# Patient Record
Sex: MALE
[De-identification: names, ages, dates, MRNs, and addresses within clinical notes are randomized per-mention and may not be internally consistent; named-entity substitution may affect disease eponyms.]

---

## 2017-06-21 PROBLEM — Z00.00 ENCOUNTER FOR PREVENTIVE HEALTH EXAMINATION: Status: ACTIVE | Noted: 2017-06-21

## 2017-07-18 ENCOUNTER — APPOINTMENT (OUTPATIENT)
Dept: DERMATOLOGY | Facility: CLINIC | Age: 42
End: 2017-07-18

## 2017-07-18 VITALS
WEIGHT: 235 LBS | DIASTOLIC BLOOD PRESSURE: 76 MMHG | HEIGHT: 69 IN | BODY MASS INDEX: 34.8 KG/M2 | SYSTOLIC BLOOD PRESSURE: 128 MMHG

## 2017-07-18 DIAGNOSIS — B36.0 PITYRIASIS VERSICOLOR: ICD-10-CM

## 2017-07-18 RX ORDER — VALSARTAN AND HYDROCHLOROTHIAZIDE 320; 12.5 MG/1; MG/1
320-12.5 TABLET, FILM COATED ORAL
Qty: 30 | Refills: 0 | Status: ACTIVE | COMMUNITY
Start: 2017-02-11

## 2017-07-18 RX ORDER — VALSARTAN 320 MG/1
320 TABLET, COATED ORAL
Qty: 30 | Refills: 0 | Status: ACTIVE | COMMUNITY
Start: 2016-12-11

## 2017-07-18 RX ORDER — KETOCONAZOLE 20.5 MG/ML
2 SHAMPOO, SUSPENSION TOPICAL
Qty: 1 | Refills: 3 | Status: ACTIVE | COMMUNITY
Start: 2017-07-18 | End: 1900-01-01

## 2017-07-18 RX ORDER — IBUPROFEN 600 MG/1
600 TABLET, FILM COATED ORAL
Qty: 40 | Refills: 0 | Status: ACTIVE | COMMUNITY
Start: 2017-07-06

## 2017-07-18 RX ORDER — MOMETASONE FUROATE 1 MG/G
0.1 OINTMENT TOPICAL
Refills: 0 | Status: ACTIVE | COMMUNITY

## 2017-07-18 RX ORDER — TADALAFIL 5 MG/1
5 TABLET, FILM COATED ORAL
Qty: 30 | Refills: 0 | Status: ACTIVE | COMMUNITY
Start: 2017-03-20

## 2017-07-18 RX ORDER — BETAMETHASONE VALERATE 1 MG/G
0.1 CREAM TOPICAL
Refills: 0 | Status: ACTIVE | COMMUNITY

## 2017-07-18 RX ORDER — AMOXICILLIN 500 MG/1
500 CAPSULE ORAL
Qty: 21 | Refills: 0 | Status: ACTIVE | COMMUNITY
Start: 2017-07-06

## 2017-07-18 RX ORDER — HALOBETASOL PROPIONATE 0.5 MG/G
0.05 CREAM TOPICAL
Refills: 0 | Status: ACTIVE | COMMUNITY

## 2017-07-18 RX ORDER — DOXYCYCLINE 50 MG/1
50 CAPSULE ORAL
Qty: 60 | Refills: 0 | Status: ACTIVE | COMMUNITY
Start: 2017-02-11

## 2017-07-18 RX ORDER — CLOTRIMAZOLE AND BETAMETHASONE DIPROPIONATE 10; .5 MG/G; MG/G
1-0.05 CREAM TOPICAL
Refills: 0 | Status: ACTIVE | COMMUNITY

## 2017-07-18 RX ORDER — ALBUTEROL SULFATE 90 UG/1
108 (90 BASE) AEROSOL, METERED RESPIRATORY (INHALATION)
Qty: 8 | Refills: 0 | Status: ACTIVE | COMMUNITY
Start: 2017-02-11

## 2017-07-18 RX ORDER — BETAMETHASONE VALERATE 1 MG/ML
0.1 LOTION CUTANEOUS
Qty: 60 | Refills: 0 | Status: ACTIVE | COMMUNITY
Start: 2017-02-11

## 2025-07-08 ENCOUNTER — INPATIENT (INPATIENT)
Facility: HOSPITAL | Age: 50
LOS: 1 days | Discharge: HOME | DRG: 446 | End: 2025-07-10
Attending: STUDENT IN AN ORGANIZED HEALTH CARE EDUCATION/TRAINING PROGRAM | Admitting: STUDENT IN AN ORGANIZED HEALTH CARE EDUCATION/TRAINING PROGRAM
Payer: COMMERCIAL

## 2025-07-08 VITALS
TEMPERATURE: 98 F | WEIGHT: 220.02 LBS | SYSTOLIC BLOOD PRESSURE: 117 MMHG | DIASTOLIC BLOOD PRESSURE: 77 MMHG | HEIGHT: 68 IN | HEART RATE: 56 BPM | OXYGEN SATURATION: 98 % | RESPIRATION RATE: 20 BRPM

## 2025-07-08 DIAGNOSIS — K81.9 CHOLECYSTITIS, UNSPECIFIED: ICD-10-CM

## 2025-07-08 DIAGNOSIS — Z98.890 OTHER SPECIFIED POSTPROCEDURAL STATES: Chronic | ICD-10-CM

## 2025-07-08 DIAGNOSIS — Z90.89 ACQUIRED ABSENCE OF OTHER ORGANS: Chronic | ICD-10-CM

## 2025-07-08 LAB
ALBUMIN SERPL ELPH-MCNC: 4.5 G/DL — SIGNIFICANT CHANGE UP (ref 3.3–5)
ALP SERPL-CCNC: 89 U/L — SIGNIFICANT CHANGE UP (ref 40–120)
ALT FLD-CCNC: 37 U/L — SIGNIFICANT CHANGE UP (ref 10–45)
ANION GAP SERPL CALC-SCNC: 15 MMOL/L — SIGNIFICANT CHANGE UP (ref 5–17)
APTT BLD: 29.4 SEC — SIGNIFICANT CHANGE UP (ref 26.1–36.8)
AST SERPL-CCNC: 21 U/L — SIGNIFICANT CHANGE UP (ref 10–40)
BASOPHILS # BLD AUTO: 0.06 K/UL — SIGNIFICANT CHANGE UP (ref 0–0.2)
BASOPHILS NFR BLD AUTO: 0.9 % — SIGNIFICANT CHANGE UP (ref 0–2)
BILIRUB SERPL-MCNC: 0.4 MG/DL — SIGNIFICANT CHANGE UP (ref 0.2–1.2)
BUN SERPL-MCNC: 11 MG/DL — SIGNIFICANT CHANGE UP (ref 7–23)
CALCIUM SERPL-MCNC: 9.4 MG/DL — SIGNIFICANT CHANGE UP (ref 8.4–10.5)
CHLORIDE SERPL-SCNC: 103 MMOL/L — SIGNIFICANT CHANGE UP (ref 96–108)
CO2 SERPL-SCNC: 22 MMOL/L — SIGNIFICANT CHANGE UP (ref 22–31)
CREAT SERPL-MCNC: 1 MG/DL — SIGNIFICANT CHANGE UP (ref 0.5–1.3)
EGFR: 92 ML/MIN/1.73M2 — SIGNIFICANT CHANGE UP
EGFR: 92 ML/MIN/1.73M2 — SIGNIFICANT CHANGE UP
EOSINOPHIL # BLD AUTO: 0.25 K/UL — SIGNIFICANT CHANGE UP (ref 0–0.5)
EOSINOPHIL NFR BLD AUTO: 3.6 % — SIGNIFICANT CHANGE UP (ref 0–6)
GAS PNL BLDV: SIGNIFICANT CHANGE UP
GAS PNL BLDV: SIGNIFICANT CHANGE UP
GLUCOSE SERPL-MCNC: 146 MG/DL — HIGH (ref 70–99)
HCT VFR BLD CALC: 45.5 % — SIGNIFICANT CHANGE UP (ref 39–50)
HGB BLD-MCNC: 14.6 G/DL — SIGNIFICANT CHANGE UP (ref 13–17)
IMM GRANULOCYTES # BLD AUTO: 0.01 K/UL — SIGNIFICANT CHANGE UP (ref 0–0.07)
IMM GRANULOCYTES NFR BLD AUTO: 0.1 % — SIGNIFICANT CHANGE UP (ref 0–0.9)
INR BLD: 1.05 RATIO — SIGNIFICANT CHANGE UP (ref 0.85–1.16)
LACTATE SERPL-SCNC: 2.5 MMOL/L — HIGH (ref 0.5–2)
LIDOCAIN IGE QN: 27 U/L — SIGNIFICANT CHANGE UP (ref 7–60)
LYMPHOCYTES # BLD AUTO: 2.96 K/UL — SIGNIFICANT CHANGE UP (ref 1–3.3)
LYMPHOCYTES NFR BLD AUTO: 43 % — SIGNIFICANT CHANGE UP (ref 13–44)
MAGNESIUM SERPL-MCNC: 1.8 MG/DL — SIGNIFICANT CHANGE UP (ref 1.6–2.6)
MCHC RBC-ENTMCNC: 27 PG — SIGNIFICANT CHANGE UP (ref 27–34)
MCHC RBC-ENTMCNC: 32.1 G/DL — SIGNIFICANT CHANGE UP (ref 32–36)
MCV RBC AUTO: 84.1 FL — SIGNIFICANT CHANGE UP (ref 80–100)
MONOCYTES # BLD AUTO: 0.51 K/UL — SIGNIFICANT CHANGE UP (ref 0–0.9)
MONOCYTES NFR BLD AUTO: 7.4 % — SIGNIFICANT CHANGE UP (ref 2–14)
NEUTROPHILS # BLD AUTO: 3.1 K/UL — SIGNIFICANT CHANGE UP (ref 1.8–7.4)
NEUTROPHILS NFR BLD AUTO: 45 % — SIGNIFICANT CHANGE UP (ref 43–77)
NRBC # BLD AUTO: 0 K/UL — SIGNIFICANT CHANGE UP (ref 0–0)
NRBC # FLD: 0 K/UL — SIGNIFICANT CHANGE UP (ref 0–0)
NRBC BLD AUTO-RTO: 0 /100 WBCS — SIGNIFICANT CHANGE UP (ref 0–0)
PHOSPHATE SERPL-MCNC: 2.3 MG/DL — LOW (ref 2.5–4.5)
PLATELET # BLD AUTO: 210 K/UL — SIGNIFICANT CHANGE UP (ref 150–400)
PMV BLD: 10.3 FL — SIGNIFICANT CHANGE UP (ref 7–13)
POTASSIUM SERPL-MCNC: 3.8 MMOL/L — SIGNIFICANT CHANGE UP (ref 3.5–5.3)
POTASSIUM SERPL-SCNC: 3.8 MMOL/L — SIGNIFICANT CHANGE UP (ref 3.5–5.3)
PROT SERPL-MCNC: 8 G/DL — SIGNIFICANT CHANGE UP (ref 6–8.3)
PROTHROM AB SERPL-ACNC: 12 SEC — SIGNIFICANT CHANGE UP (ref 9.9–13.4)
RBC # BLD: 5.41 M/UL — SIGNIFICANT CHANGE UP (ref 4.2–5.8)
RBC # FLD: 13.4 % — SIGNIFICANT CHANGE UP (ref 10.3–14.5)
SODIUM SERPL-SCNC: 140 MMOL/L — SIGNIFICANT CHANGE UP (ref 135–145)
TROPONIN T, HIGH SENSITIVITY RESULT: <6 NG/L — SIGNIFICANT CHANGE UP (ref 0–51)
WBC # BLD: 6.89 K/UL — SIGNIFICANT CHANGE UP (ref 3.8–10.5)
WBC # FLD AUTO: 6.89 K/UL — SIGNIFICANT CHANGE UP (ref 3.8–10.5)

## 2025-07-08 PROCEDURE — 99222 1ST HOSP IP/OBS MODERATE 55: CPT | Mod: 57

## 2025-07-08 PROCEDURE — 74177 CT ABD & PELVIS W/CONTRAST: CPT | Mod: 26

## 2025-07-08 PROCEDURE — 99285 EMERGENCY DEPT VISIT HI MDM: CPT

## 2025-07-08 PROCEDURE — 93010 ELECTROCARDIOGRAM REPORT: CPT

## 2025-07-08 PROCEDURE — 71046 X-RAY EXAM CHEST 2 VIEWS: CPT | Mod: 26

## 2025-07-08 PROCEDURE — 76705 ECHO EXAM OF ABDOMEN: CPT | Mod: 26

## 2025-07-08 RX ORDER — METOCLOPRAMIDE HCL 10 MG
10 TABLET ORAL ONCE
Refills: 0 | Status: COMPLETED | OUTPATIENT
Start: 2025-07-08 | End: 2025-07-08

## 2025-07-08 RX ORDER — OXYCODONE HYDROCHLORIDE 30 MG/1
5 TABLET ORAL
Refills: 0 | Status: DISCONTINUED | OUTPATIENT
Start: 2025-07-08 | End: 2025-07-09

## 2025-07-08 RX ORDER — ONDANSETRON HCL/PF 4 MG/2 ML
4 VIAL (ML) INJECTION ONCE
Refills: 0 | Status: COMPLETED | OUTPATIENT
Start: 2025-07-08 | End: 2025-07-08

## 2025-07-08 RX ORDER — ACETAMINOPHEN 500 MG/5ML
1000 LIQUID (ML) ORAL ONCE
Refills: 0 | Status: COMPLETED | OUTPATIENT
Start: 2025-07-08 | End: 2025-07-08

## 2025-07-08 RX ORDER — OXYCODONE HYDROCHLORIDE 30 MG/1
2.5 TABLET ORAL
Refills: 0 | Status: DISCONTINUED | OUTPATIENT
Start: 2025-07-08 | End: 2025-07-09

## 2025-07-08 RX ORDER — SODIUM CHLORIDE 9 G/1000ML
1000 INJECTION, SOLUTION INTRAVENOUS ONCE
Refills: 0 | Status: COMPLETED | OUTPATIENT
Start: 2025-07-08 | End: 2025-07-08

## 2025-07-08 RX ORDER — ENOXAPARIN SODIUM 100 MG/ML
40 INJECTION SUBCUTANEOUS EVERY 24 HOURS
Refills: 0 | Status: DISCONTINUED | OUTPATIENT
Start: 2025-07-08 | End: 2025-07-09

## 2025-07-08 RX ORDER — SODIUM CHLORIDE 9 G/1000ML
1000 INJECTION, SOLUTION INTRAVENOUS
Refills: 0 | Status: DISCONTINUED | OUTPATIENT
Start: 2025-07-08 | End: 2025-07-09

## 2025-07-08 RX ORDER — HEPARIN SODIUM 1000 [USP'U]/ML
5000 INJECTION INTRAVENOUS; SUBCUTANEOUS EVERY 8 HOURS
Refills: 0 | Status: DISCONTINUED | OUTPATIENT
Start: 2025-07-08 | End: 2025-07-08

## 2025-07-08 RX ORDER — ACETAMINOPHEN 500 MG/5ML
975 LIQUID (ML) ORAL EVERY 6 HOURS
Refills: 0 | Status: DISCONTINUED | OUTPATIENT
Start: 2025-07-08 | End: 2025-07-09

## 2025-07-08 RX ORDER — PIPERACILLIN-TAZO-DEXTROSE,ISO 3.375G/5
3.38 IV SOLUTION, PIGGYBACK PREMIX FROZEN(ML) INTRAVENOUS ONCE
Refills: 0 | Status: COMPLETED | OUTPATIENT
Start: 2025-07-08 | End: 2025-07-08

## 2025-07-08 RX ORDER — MAGNESIUM, ALUMINUM HYDROXIDE 200-200 MG
30 TABLET,CHEWABLE ORAL ONCE
Refills: 0 | Status: COMPLETED | OUTPATIENT
Start: 2025-07-08 | End: 2025-07-08

## 2025-07-08 RX ORDER — PIPERACILLIN-TAZO-DEXTROSE,ISO 3.375G/5
3.38 IV SOLUTION, PIGGYBACK PREMIX FROZEN(ML) INTRAVENOUS EVERY 8 HOURS
Refills: 0 | Status: DISCONTINUED | OUTPATIENT
Start: 2025-07-08 | End: 2025-07-09

## 2025-07-08 RX ADMIN — Medication 1000 MILLIGRAM(S): at 11:00

## 2025-07-08 RX ADMIN — Medication 4 MILLIGRAM(S): at 08:42

## 2025-07-08 RX ADMIN — Medication 1000 MILLILITER(S): at 13:44

## 2025-07-08 RX ADMIN — Medication 25 GRAM(S): at 18:37

## 2025-07-08 RX ADMIN — Medication 200 GRAM(S): at 14:53

## 2025-07-08 RX ADMIN — Medication 30 MILLILITER(S): at 08:36

## 2025-07-08 RX ADMIN — SODIUM CHLORIDE 100 MILLILITER(S): 9 INJECTION, SOLUTION INTRAVENOUS at 18:08

## 2025-07-08 RX ADMIN — Medication 4 MILLIGRAM(S): at 12:55

## 2025-07-08 RX ADMIN — Medication 400 MILLIGRAM(S): at 10:45

## 2025-07-08 RX ADMIN — Medication 4 MILLIGRAM(S): at 13:30

## 2025-07-08 RX ADMIN — Medication 10 MILLIGRAM(S): at 12:55

## 2025-07-08 RX ADMIN — SODIUM CHLORIDE 1000 MILLILITER(S): 9 INJECTION, SOLUTION INTRAVENOUS at 12:52

## 2025-07-08 RX ADMIN — Medication 4 MILLIGRAM(S): at 08:45

## 2025-07-08 RX ADMIN — Medication 20 MILLIGRAM(S): at 08:36

## 2025-07-08 RX ADMIN — Medication 3.38 GRAM(S): at 15:25

## 2025-07-08 RX ADMIN — Medication 4 MILLIGRAM(S): at 08:36

## 2025-07-08 RX ADMIN — SODIUM CHLORIDE 1000 MILLILITER(S): 9 INJECTION, SOLUTION INTRAVENOUS at 08:36

## 2025-07-08 RX ADMIN — Medication 975 MILLIGRAM(S): at 22:38

## 2025-07-08 RX ADMIN — Medication 1000 MILLIGRAM(S): at 11:25

## 2025-07-08 RX ADMIN — SODIUM CHLORIDE 1000 MILLILITER(S): 9 INJECTION, SOLUTION INTRAVENOUS at 13:45

## 2025-07-08 RX ADMIN — SODIUM CHLORIDE 1000 MILLILITER(S): 9 INJECTION, SOLUTION INTRAVENOUS at 09:40

## 2025-07-08 RX ADMIN — Medication 1000 MILLILITER(S): at 14:45

## 2025-07-08 NOTE — H&P ADULT - NSHPPHYSICALEXAM_GEN_ALL_CORE
PHYSICAL EXAM  GENERAL: NAD, lying in bed comfortably  CHEST: nonlabored, no increased WOB  ABDOMEN: Soft, Nondistended. +epigastric and RUQ tenderness  EXTREMITIES: Well perfused. No clubbing, cyanosis, or edema  NERVOUS SYSTEM:  Alert & Oriented X3

## 2025-07-08 NOTE — ED PROVIDER NOTE - ATTENDING CONTRIBUTION TO CARE
I performed a history and physical exam of the patient and discussed their management with the resident/fellow/student. I have reviewed the resident/fellow/student note and agree with the documented findings and plan of care, except as noted. I have personally performed a substantive portion of the visit including all aspects of the medical decision making. My medical decision making and observations are found above. Please refer to any progress notes for updates on clinical course. My notes supersedes the above resident/fellow/student note in case of discrepancy. Anthony Navas DO (EM Attending)    50-year-old male past medical history of hypertension presenting with abdominal pain.  States he woke up this morning at 6 AM with epigastric pain associated with nausea.  Noticing it to be nonradiating in nature.  Denies any fevers, vomiting, diarrhea, dysuria, hematuria.  Denies any black or bloody stools, flank pain.  No prior abdominal surgeries.  Does not drink alcohol.  States he took Tylenol this morning prior to arrival.     Anthony Nvaas DO (EM Attending)   Physical Exam:    Gen: NAD, AOx3  Head: NCAT  HEENT: EOMI, PEERLA  Lung: CTAB  CV: RRR  Abd: soft, +epigastric ttp, ND, no guarding, no rigidity, no rebound tenderness, no CVA tenderness   MSK: no visible deformities, ROM normal in UE/LE, no midline ttp to entire spine, pelvis stable  Neuro: No focal sensory or motor deficits. Sensation intact to light touch all extremities.  Skin: Warm, well perfused, no rash, no leg swelling    Vital signs stable, afebrile, not hypoxic. Plan for basic labs, lipase, troponin, EKG, CXR, CTAP  Differential diagnosis includes but not limited to pancreatitis vs. cholecystitis vs. gastritis vs. infectious etiology vs. metabolic derangement  Final dispo pending labs, imaging, close reassessments.

## 2025-07-08 NOTE — ED ADULT NURSE NOTE - OBJECTIVE STATEMENT
0805 50 yr old male c/o epigastric pain since 6 AM. Took Tylenol at home. Went to work and c/o increasing pain. Denies N/V, dizziness, palp, chest pain, SOB. Looks uncomfortable. Cardiac monitor applied. 12 Lead EKG done. IV inserted. Labs drawn. Fall risk precautions maintained 0805 50 yr old male c/o epigastric pain since 6 AM. Took Tylenol at home. Went to work and c/o increasing pain. Denies N/V, dizziness, palp, chest pain, SOB. Looks uncomfortable. Cardiac monitor applied. 12 Lead EKG done. IV inserted. Labs drawn. Fall risk precautions maintained. Color pink. Skin W&D. Abd soft. Sore to palp epigastric region

## 2025-07-08 NOTE — H&P ADULT - HISTORY OF PRESENT ILLNESS
51yo M h/o HTN presenting with epigastric pain for 1 day. Reports that overnight he began to feel epigastric pain, non-radiating, constant, and it has persisted until now. No n/v, no fevers/chills. Pt last ate yesterday, did not have pain with eating. Is having normal BMs.

## 2025-07-08 NOTE — H&P ADULT - ATTENDING COMMENTS
50 year old man presenting with one day of epigastric pain. Workup notable for WBC count 6, T.Oc normal. US personally reviewed - distended gallbladder with large immobile stone in the neck.     Clinical presentation consistent with early acute cholecystitis.     Admit to [facility].   IV antibiotics.   NPO at midnight.  OK for lovenox for DVT ppx.  Plan for laparoscopic cholecystectomy, possible open. Scheduled in block time for tomorrow.      Yolis Kohli M.D.  Department of Trauma, Acute Care Surgery and Surgical Critical Care

## 2025-07-08 NOTE — H&P ADULT - NSHPLABSRESULTS_GEN_ALL_CORE
LABS:                         14.6   6.89  )-----------( 210      ( 08 Jul 2025 08:37 )             45.5     07-08    140  |  103  |  11  ----------------------------<  146[H]  3.8   |  22  |  1.00    Ca    9.4      08 Jul 2025 08:37  Phos  2.3     07-08  Mg     1.8     07-08    TPro  8.0  /  Alb  4.5  /  TBili  0.4  /  DBili  x   /  AST  21  /  ALT  37  /  AlkPhos  89  07-08    PT/INR - ( 08 Jul 2025 08:37 )   PT: 12.0 sec;   INR: 1.05 ratio         PTT - ( 08 Jul 2025 08:37 )  PTT:29.4 sec  Urinalysis Basic - ( 08 Jul 2025 08:37 )    Color: x / Appearance: x / SG: x / pH: x  Gluc: 146 mg/dL / Ketone: x  / Bili: x / Urobili: x   Blood: x / Protein: x / Nitrite: x   Leuk Esterase: x / RBC: x / WBC x   Sq Epi: x / Non Sq Epi: x / Bacteria: x            CAPILLARY BLOOD GLUCOSE      < from: POCUS ED Abdomen Limited (07.08.25 @ 15:04) >    INTERPRETATION:  Grayscale imaging of the right upper quadrant was   performed.  The gallbladder was visualized and scanned in longitudinal and transverse   planes.  The anterior gallbladder wall measured  0.26.cm.  The common bile duct measured 0.23.cm.  Large gallstone measuring 2.15cm noted in gallbladder neck, does not   change position when patient is placed in lateral decubitus position.   Additional smaller mobile stones noted within gallbladder which do change   position with patient positioning.  Sludge not visualized.  Pericholecystic fluid not present.  Gallbladder wall edema not present.  Sonographic Tineo's sign not present.  Small cystic structure noted without doppler flow located near neck of   gallbladder measuring 0.82cm.    IMPRESSION:Large impacted stone in neck of gallbladder without additional   ultrasonographic signs of acute cholecystitis.    < end of copied text >

## 2025-07-08 NOTE — H&P ADULT - ASSESSMENT
51yo M h/o HTN presenting with epigastric pain for 1 day, found to have large stone in GB neck, concern for early cholecystitis.    Plan  - Admit to [facility] Dr. Kohli  - Added on for OR tomorrow for lap reyna  - C/w Abx and IVF    ACS/Trauma Surgery  Pager [phone]  51yo M h/o HTN presenting with epigastric pain for 1 day, found to have large stone in GB neck, concern for early cholecystitis.    Plan  - Admit to [facility] Dr. Kohli  - Added on for OR tomorrow for lap reyna  - C/w Abx and IVF  - Consent done in chart    ACS/Trauma Surgery  Pager [phone]

## 2025-07-08 NOTE — ED PROVIDER NOTE - PROGRESS NOTE DETAILS
Anthony Navas DO (EM Attending)  Bedside POCUS shows stone in neck of gallbladder.  Will order IV antibiotics.  Surgery to be consulted. Anhtony Navas DO (EM Attending)  Signed out pending surgery evaluation. Segundo Vazquez (EM/IM PGY-2). Evaluated by surgery, to be admitted for cholecystectomy.

## 2025-07-08 NOTE — PATIENT PROFILE ADULT - FALL HARM RISK - RISK INTERVENTIONS
Assistance OOB with selected safe patient handling equipment/Assistance with ambulation/Communicate Fall Risk and Risk Factors to all staff, patient, and family/Monitor for mental status changes/Reinforce activity limits and safety measures with patient and family/Reorient to person, place and time as needed/Review medications for side effects contributing to fall risk/Sit up slowly, dangle for a short time, stand at bedside before walking/Visual Cue: Yellow wristband/Bed in lowest position, wheels locked, appropriate side rails in place/Call bell, personal items and telephone in reach/Instruct patient to call for assistance before getting out of bed or chair/Non-slip footwear when patient is out of bed/[place] to call system/Physically safe environment - no spills, clutter or unnecessary equipment/Purposeful Proactive Rounding/Room/bathroom lighting operational, light cord in reach

## 2025-07-08 NOTE — ED PROVIDER NOTE - NS ED ATTENDING STATEMENT MOD
I have seen and examined this patient and fully participated in the care of this patient as the teaching attending.  The service was shared with the BRIDGETT.  I reviewed and verified the documentation.

## 2025-07-08 NOTE — ED ADULT NURSE NOTE - NSFALLRISKINTERV_ED_ALL_ED
Assistance OOB with selected safe patient handling equipment if applicable/Assistance with ambulation/Communicate fall risk and risk factors to all staff, patient, and family/Monitor gait and stability/Provide visual cue: yellow wristband, yellow gown, etc/Reinforce activity limits and safety measures with patient and family/Call bell, personal items and telephone in reach/Instruct patient to call for assistance before getting out of bed/chair/stretcher/Non-slip footwear applied when patient is off stretcher/Kimberly to call system/Physically safe environment - no spills, clutter or unnecessary equipment/Purposeful Proactive Rounding/Room/bathroom lighting operational, light cord in reach

## 2025-07-09 ENCOUNTER — TRANSCRIPTION ENCOUNTER (OUTPATIENT)
Age: 50
End: 2025-07-09

## 2025-07-09 ENCOUNTER — RESULT REVIEW (OUTPATIENT)
Age: 50
End: 2025-07-09

## 2025-07-09 LAB
ALBUMIN SERPL ELPH-MCNC: 3.9 G/DL — SIGNIFICANT CHANGE UP (ref 3.3–5)
ALP SERPL-CCNC: 73 U/L — SIGNIFICANT CHANGE UP (ref 40–120)
ALT FLD-CCNC: 27 U/L — SIGNIFICANT CHANGE UP (ref 10–45)
ANION GAP SERPL CALC-SCNC: 11 MMOL/L — SIGNIFICANT CHANGE UP (ref 5–17)
APPEARANCE UR: CLEAR — SIGNIFICANT CHANGE UP
AST SERPL-CCNC: 16 U/L — SIGNIFICANT CHANGE UP (ref 10–40)
BACTERIA # UR AUTO: NEGATIVE /HPF — SIGNIFICANT CHANGE UP
BILIRUB SERPL-MCNC: 0.6 MG/DL — SIGNIFICANT CHANGE UP (ref 0.2–1.2)
BILIRUB UR-MCNC: NEGATIVE — SIGNIFICANT CHANGE UP
BUN SERPL-MCNC: 7 MG/DL — SIGNIFICANT CHANGE UP (ref 7–23)
CALCIUM SERPL-MCNC: 8.9 MG/DL — SIGNIFICANT CHANGE UP (ref 8.4–10.5)
CAST: 0 /LPF — SIGNIFICANT CHANGE UP (ref 0–4)
CHLORIDE SERPL-SCNC: 103 MMOL/L — SIGNIFICANT CHANGE UP (ref 96–108)
CO2 SERPL-SCNC: 23 MMOL/L — SIGNIFICANT CHANGE UP (ref 22–31)
COLOR SPEC: YELLOW — SIGNIFICANT CHANGE UP
CREAT SERPL-MCNC: 0.86 MG/DL — SIGNIFICANT CHANGE UP (ref 0.5–1.3)
DIFF PNL FLD: ABNORMAL
EGFR: 106 ML/MIN/1.73M2 — SIGNIFICANT CHANGE UP
EGFR: 106 ML/MIN/1.73M2 — SIGNIFICANT CHANGE UP
GLUCOSE SERPL-MCNC: 119 MG/DL — HIGH (ref 70–99)
GLUCOSE UR QL: NEGATIVE MG/DL — SIGNIFICANT CHANGE UP
HCT VFR BLD CALC: 39 % — SIGNIFICANT CHANGE UP (ref 39–50)
HGB BLD-MCNC: 12.8 G/DL — LOW (ref 13–17)
KETONES UR QL: NEGATIVE MG/DL — SIGNIFICANT CHANGE UP
LEUKOCYTE ESTERASE UR-ACNC: NEGATIVE — SIGNIFICANT CHANGE UP
MAGNESIUM SERPL-MCNC: 1.8 MG/DL — SIGNIFICANT CHANGE UP (ref 1.6–2.6)
MCHC RBC-ENTMCNC: 26.9 PG — LOW (ref 27–34)
MCHC RBC-ENTMCNC: 32.8 G/DL — SIGNIFICANT CHANGE UP (ref 32–36)
MCV RBC AUTO: 82.1 FL — SIGNIFICANT CHANGE UP (ref 80–100)
NITRITE UR-MCNC: NEGATIVE — SIGNIFICANT CHANGE UP
NRBC # BLD AUTO: 0 K/UL — SIGNIFICANT CHANGE UP (ref 0–0)
NRBC # FLD: 0 K/UL — SIGNIFICANT CHANGE UP (ref 0–0)
NRBC BLD AUTO-RTO: 0 /100 WBCS — SIGNIFICANT CHANGE UP (ref 0–0)
PH UR: 7 — SIGNIFICANT CHANGE UP (ref 5–8)
PHOSPHATE SERPL-MCNC: 3.4 MG/DL — SIGNIFICANT CHANGE UP (ref 2.5–4.5)
PLATELET # BLD AUTO: 195 K/UL — SIGNIFICANT CHANGE UP (ref 150–400)
PMV BLD: 10.4 FL — SIGNIFICANT CHANGE UP (ref 7–13)
POTASSIUM SERPL-MCNC: 3.5 MMOL/L — SIGNIFICANT CHANGE UP (ref 3.5–5.3)
POTASSIUM SERPL-SCNC: 3.5 MMOL/L — SIGNIFICANT CHANGE UP (ref 3.5–5.3)
PROT SERPL-MCNC: 6.8 G/DL — SIGNIFICANT CHANGE UP (ref 6–8.3)
PROT UR-MCNC: NEGATIVE MG/DL — SIGNIFICANT CHANGE UP
RBC # BLD: 4.75 M/UL — SIGNIFICANT CHANGE UP (ref 4.2–5.8)
RBC # FLD: 13.4 % — SIGNIFICANT CHANGE UP (ref 10.3–14.5)
RBC CASTS # UR COMP ASSIST: 5 /HPF — HIGH (ref 0–4)
SODIUM SERPL-SCNC: 137 MMOL/L — SIGNIFICANT CHANGE UP (ref 135–145)
SP GR SPEC: 1.02 — SIGNIFICANT CHANGE UP (ref 1–1.03)
SQUAMOUS # UR AUTO: 0 /HPF — SIGNIFICANT CHANGE UP (ref 0–5)
UROBILINOGEN FLD QL: 0.2 MG/DL — SIGNIFICANT CHANGE UP (ref 0.2–1)
WBC # BLD: 13.63 K/UL — HIGH (ref 3.8–10.5)
WBC # FLD AUTO: 13.63 K/UL — HIGH (ref 3.8–10.5)
WBC UR QL: 1 /HPF — SIGNIFICANT CHANGE UP (ref 0–5)

## 2025-07-09 PROCEDURE — 88304 TISSUE EXAM BY PATHOLOGIST: CPT | Mod: 26

## 2025-07-09 PROCEDURE — 47562 LAPAROSCOPIC CHOLECYSTECTOMY: CPT

## 2025-07-09 DEVICE — CLIP APPLIER TELEFLEX WECK AUTO 5MM X 35CM: Type: IMPLANTABLE DEVICE | Status: FUNCTIONAL

## 2025-07-09 RX ORDER — SODIUM CHLORIDE 9 G/1000ML
1000 INJECTION, SOLUTION INTRAVENOUS
Refills: 0 | Status: DISCONTINUED | OUTPATIENT
Start: 2025-07-09 | End: 2025-07-10

## 2025-07-09 RX ORDER — OXYCODONE HYDROCHLORIDE 30 MG/1
5 TABLET ORAL EVERY 4 HOURS
Refills: 0 | Status: DISCONTINUED | OUTPATIENT
Start: 2025-07-09 | End: 2025-07-10

## 2025-07-09 RX ORDER — ACETAMINOPHEN 500 MG/5ML
1000 LIQUID (ML) ORAL EVERY 6 HOURS
Refills: 0 | Status: DISCONTINUED | OUTPATIENT
Start: 2025-07-09 | End: 2025-07-10

## 2025-07-09 RX ORDER — HYDROMORPHONE/SOD CHLOR,ISO/PF 2 MG/10 ML
0.5 SYRINGE (ML) INJECTION
Refills: 0 | Status: DISCONTINUED | OUTPATIENT
Start: 2025-07-09 | End: 2025-07-09

## 2025-07-09 RX ORDER — OXYCODONE HYDROCHLORIDE 30 MG/1
5 TABLET ORAL ONCE
Refills: 0 | Status: DISCONTINUED | OUTPATIENT
Start: 2025-07-09 | End: 2025-07-09

## 2025-07-09 RX ORDER — ONDANSETRON HCL/PF 4 MG/2 ML
4 VIAL (ML) INJECTION ONCE
Refills: 0 | Status: DISCONTINUED | OUTPATIENT
Start: 2025-07-09 | End: 2025-07-09

## 2025-07-09 RX ORDER — ENOXAPARIN SODIUM 100 MG/ML
40 INJECTION SUBCUTANEOUS EVERY 24 HOURS
Refills: 0 | Status: DISCONTINUED | OUTPATIENT
Start: 2025-07-09 | End: 2025-07-10

## 2025-07-09 RX ORDER — KETOROLAC TROMETHAMINE 30 MG/ML
15 INJECTION, SOLUTION INTRAMUSCULAR; INTRAVENOUS EVERY 6 HOURS
Refills: 0 | Status: COMPLETED | OUTPATIENT
Start: 2025-07-09 | End: 2025-07-10

## 2025-07-09 RX ORDER — OXYCODONE HYDROCHLORIDE 30 MG/1
2.5 TABLET ORAL EVERY 4 HOURS
Refills: 0 | Status: DISCONTINUED | OUTPATIENT
Start: 2025-07-09 | End: 2025-07-10

## 2025-07-09 RX ADMIN — OXYCODONE HYDROCHLORIDE 2.5 MILLIGRAM(S): 30 TABLET ORAL at 22:15

## 2025-07-09 RX ADMIN — Medication 25 GRAM(S): at 02:10

## 2025-07-09 RX ADMIN — Medication 975 MILLIGRAM(S): at 05:16

## 2025-07-09 RX ADMIN — ENOXAPARIN SODIUM 40 MILLIGRAM(S): 100 INJECTION SUBCUTANEOUS at 05:07

## 2025-07-09 RX ADMIN — OXYCODONE HYDROCHLORIDE 2.5 MILLIGRAM(S): 30 TABLET ORAL at 23:22

## 2025-07-09 RX ADMIN — SODIUM CHLORIDE 100 MILLILITER(S): 9 INJECTION, SOLUTION INTRAVENOUS at 20:30

## 2025-07-09 RX ADMIN — Medication 25 GRAM(S): at 10:25

## 2025-07-09 RX ADMIN — Medication 975 MILLIGRAM(S): at 05:07

## 2025-07-09 NOTE — BRIEF OPERATIVE NOTE - OPERATION/FINDINGS
-Ramsey entry  -Distended/inflamed GB needle decompressed  -No cystic artery identified   -Hem-O-Pia Clip 5mm x3 Cystic Duct  -Very intrahepatic GB, spillage bile/stones  -Suction/Irrigation GB fossa/Hurley's pouch  -0 Vicryl Fascial Closure

## 2025-07-09 NOTE — PROGRESS NOTE ADULT - ATTENDING COMMENTS
50 year old man presenting with early acute cholecystitis.     WBC count 13 from 6.   LFTs remain normal, normal bilirubin.     He had no further questions about surgery, ready to proceed.     Tylenol, oxycodone PRN  NPO with meds  IVF  Zosyn  Lovenox for DVT ppx    Plan for laparoscopic cholecystectomy, possible open.       Yolis Kohli M.D.  Department of Trauma, Acute Care Surgery and Surgical Critical Care

## 2025-07-09 NOTE — PROGRESS NOTE ADULT - ASSESSMENT
51yo M h/o HTN and inguinal hernia repair presenting with epigastric pain for 1 day, found to have large stone in GB neck, concern for early cholecystitis. Incidental findings, as below, discussed with patient.  Indeterminate right renal mass. Renal mass protocol MRI recommended.    Plan  - Plan for lap reyna today, NPO  - C/w Abx and IVF  - Consent done in chart    ACS/Trauma Surgery  Pager [phone]

## 2025-07-10 ENCOUNTER — TRANSCRIPTION ENCOUNTER (OUTPATIENT)
Age: 50
End: 2025-07-10

## 2025-07-10 VITALS
OXYGEN SATURATION: 98 % | HEART RATE: 64 BPM | SYSTOLIC BLOOD PRESSURE: 118 MMHG | RESPIRATION RATE: 18 BRPM | DIASTOLIC BLOOD PRESSURE: 78 MMHG | TEMPERATURE: 98 F

## 2025-07-10 PROCEDURE — C1889: CPT

## 2025-07-10 PROCEDURE — 82330 ASSAY OF CALCIUM: CPT

## 2025-07-10 PROCEDURE — 82947 ASSAY GLUCOSE BLOOD QUANT: CPT

## 2025-07-10 PROCEDURE — 85610 PROTHROMBIN TIME: CPT

## 2025-07-10 PROCEDURE — 83735 ASSAY OF MAGNESIUM: CPT

## 2025-07-10 PROCEDURE — 86901 BLOOD TYPING SEROLOGIC RH(D): CPT

## 2025-07-10 PROCEDURE — 85014 HEMATOCRIT: CPT

## 2025-07-10 PROCEDURE — 85018 HEMOGLOBIN: CPT

## 2025-07-10 PROCEDURE — 76705 ECHO EXAM OF ABDOMEN: CPT

## 2025-07-10 PROCEDURE — 86900 BLOOD TYPING SEROLOGIC ABO: CPT

## 2025-07-10 PROCEDURE — 99285 EMERGENCY DEPT VISIT HI MDM: CPT

## 2025-07-10 PROCEDURE — 84295 ASSAY OF SERUM SODIUM: CPT

## 2025-07-10 PROCEDURE — C9399: CPT

## 2025-07-10 PROCEDURE — 81001 URINALYSIS AUTO W/SCOPE: CPT

## 2025-07-10 PROCEDURE — 85730 THROMBOPLASTIN TIME PARTIAL: CPT

## 2025-07-10 PROCEDURE — 85027 COMPLETE CBC AUTOMATED: CPT

## 2025-07-10 PROCEDURE — 84484 ASSAY OF TROPONIN QUANT: CPT

## 2025-07-10 PROCEDURE — 82435 ASSAY OF BLOOD CHLORIDE: CPT

## 2025-07-10 PROCEDURE — 83690 ASSAY OF LIPASE: CPT

## 2025-07-10 PROCEDURE — 82803 BLOOD GASES ANY COMBINATION: CPT

## 2025-07-10 PROCEDURE — 83605 ASSAY OF LACTIC ACID: CPT

## 2025-07-10 PROCEDURE — 93005 ELECTROCARDIOGRAM TRACING: CPT

## 2025-07-10 PROCEDURE — 85025 COMPLETE CBC W/AUTO DIFF WBC: CPT

## 2025-07-10 PROCEDURE — 86850 RBC ANTIBODY SCREEN: CPT

## 2025-07-10 PROCEDURE — 74177 CT ABD & PELVIS W/CONTRAST: CPT

## 2025-07-10 PROCEDURE — 84132 ASSAY OF SERUM POTASSIUM: CPT

## 2025-07-10 PROCEDURE — 80053 COMPREHEN METABOLIC PANEL: CPT

## 2025-07-10 PROCEDURE — 88304 TISSUE EXAM BY PATHOLOGIST: CPT

## 2025-07-10 PROCEDURE — 36415 COLL VENOUS BLD VENIPUNCTURE: CPT

## 2025-07-10 PROCEDURE — 71046 X-RAY EXAM CHEST 2 VIEWS: CPT

## 2025-07-10 PROCEDURE — 84100 ASSAY OF PHOSPHORUS: CPT

## 2025-07-10 RX ORDER — ACETAMINOPHEN 500 MG/5ML
100 LIQUID (ML) ORAL
Qty: 0 | Refills: 0 | DISCHARGE
Start: 2025-07-10

## 2025-07-10 RX ORDER — OXYCODONE HYDROCHLORIDE 30 MG/1
1 TABLET ORAL
Qty: 8 | Refills: 0
Start: 2025-07-10

## 2025-07-10 RX ADMIN — KETOROLAC TROMETHAMINE 15 MILLIGRAM(S): 30 INJECTION, SOLUTION INTRAMUSCULAR; INTRAVENOUS at 01:08

## 2025-07-10 RX ADMIN — Medication 400 MILLIGRAM(S): at 01:57

## 2025-07-10 RX ADMIN — Medication 400 MILLIGRAM(S): at 07:52

## 2025-07-10 RX ADMIN — Medication 1000 MILLIGRAM(S): at 08:50

## 2025-07-10 RX ADMIN — KETOROLAC TROMETHAMINE 15 MILLIGRAM(S): 30 INJECTION, SOLUTION INTRAMUSCULAR; INTRAVENOUS at 05:19

## 2025-07-10 RX ADMIN — Medication 1000 MILLIGRAM(S): at 02:57

## 2025-07-10 RX ADMIN — KETOROLAC TROMETHAMINE 15 MILLIGRAM(S): 30 INJECTION, SOLUTION INTRAMUSCULAR; INTRAVENOUS at 00:08

## 2025-07-10 RX ADMIN — ENOXAPARIN SODIUM 40 MILLIGRAM(S): 100 INJECTION SUBCUTANEOUS at 07:52

## 2025-07-10 NOTE — DISCHARGE NOTE PROVIDER - NSDCMRMEDTOKEN_GEN_ALL_CORE_FT
acetaminophen 10 mg/mL intravenous solution: 100 milliliter(s) intravenous every 6 hours  oxyCODONE 5 mg oral tablet: 1 tab(s) orally every 6 hours as needed for  severe pain MDD: 4  valsartan-hydrochlorothiazide 320 mg-12.5 mg oral tablet: 1 tab(s) orally once a day

## 2025-07-10 NOTE — PROGRESS NOTE ADULT - SUBJECTIVE AND OBJECTIVE BOX
SURGERY DAILY PROGRESS NOTE:       Subjective / Overnight events:  Patient seen and examined at bedside. Patient reports improvement in pain. Denies nausea, vomiting, fever, chills.    Objective:      MEDICATIONS  (STANDING):  acetaminophen     Tablet .. 975 milliGRAM(s) Oral every 6 hours  enoxaparin Injectable 40 milliGRAM(s) SubCutaneous every 24 hours  lactated ringers. 1000 milliLiter(s) (100 mL/Hr) IV Continuous <Continuous>  piperacillin/tazobactam IVPB.. 3.375 Gram(s) IV Intermittent every 8 hours    MEDICATIONS  (PRN):  oxyCODONE    IR 2.5 milliGRAM(s) Oral four times a day PRN Moderate Pain (4 - 6)  oxyCODONE    IR 5 milliGRAM(s) Oral four times a day PRN Severe Pain (7 - 10)      Vital Signs Last 24 Hrs  T(C): 37.1 (09 Jul 2025 08:52), Max: 37.1 (09 Jul 2025 08:52)  T(F): 98.8 (09 Jul 2025 08:52), Max: 98.8 (09 Jul 2025 08:52)  HR: 53 (09 Jul 2025 08:52) (47 - 62)  BP: 114/65 (09 Jul 2025 08:52) (114/63 - 125/75)  BP(mean): 90 (08 Jul 2025 18:53) (90 - 90)  RR: 18 (09 Jul 2025 08:52) (16 - 20)  SpO2: 95% (09 Jul 2025 08:52) (94% - 99%)    Parameters below as of 09 Jul 2025 08:52  Patient On (Oxygen Delivery Method): room air        I&O's Detail    08 Jul 2025 07:01  -  09 Jul 2025 07:00  --------------------------------------------------------  IN:  Total IN: 0 mL    OUT:    Voided (mL): 1 mL  Total OUT: 1 mL    Total NET: -1 mL          Daily     Daily     LABS:                        12.8   13.63 )-----------( 195      ( 09 Jul 2025 04:12 )             39.0     07-09    137  |  103  |  7   ----------------------------<  119[H]  3.5   |  23  |  0.86    Ca    8.9      09 Jul 2025 04:12  Phos  3.4     07-09  Mg     1.8     07-09    TPro  6.8  /  Alb  3.9  /  TBili  0.6  /  DBili  x   /  AST  16  /  ALT  27  /  AlkPhos  73  07-09    PT/INR - ( 08 Jul 2025 08:37 )   PT: 12.0 sec;   INR: 1.05 ratio         PTT - ( 08 Jul 2025 08:37 )  PTT:29.4 sec  Urinalysis Basic - ( 09 Jul 2025 04:12 )    Color: x / Appearance: x / SG: x / pH: x  Gluc: 119 mg/dL / Ketone: x  / Bili: x / Urobili: x   Blood: x / Protein: x / Nitrite: x   Leuk Esterase: x / RBC: x / WBC x   Sq Epi: x / Non Sq Epi: x / Bacteria: x          PHYSICAL EXAM  --------------------------------------------------------------------------------  Physical Exam: PHYSICAL EXAM  GENERAL: NAD, lying in bed comfortably  CHEST: nonlabored, no increased WOB  ABDOMEN: Soft, Nondistended. +epigastric and RUQ tenderness  EXTREMITIES: Well perfused. No clubbing, cyanosis, or edema  NERVOUS SYSTEM:  Alert & Oriented X3  	  
SURGERY DAILY PROGRESS NOTE:       Subjective / Overnight events:  No acute events. Post-op check performed last night patient's pain is well controlled, no n/v/d, SOB, CP, no complaints.     Objective:      MEDICATIONS  (STANDING):  acetaminophen   IVPB .. 1000 milliGRAM(s) IV Intermittent every 6 hours  enoxaparin Injectable 40 milliGRAM(s) SubCutaneous every 24 hours  ketorolac   Injectable 15 milliGRAM(s) IV Push every 6 hours  lactated ringers. 1000 milliLiter(s) (100 mL/Hr) IV Continuous <Continuous>    MEDICATIONS  (PRN):  oxyCODONE    IR 2.5 milliGRAM(s) Oral every 4 hours PRN Moderate Pain (4 - 6)  oxyCODONE    IR 5 milliGRAM(s) Oral every 4 hours PRN Severe Pain (7 - 10)      Vital Signs Last 24 Hrs  T(C): 36.8 (10 Jul 2025 09:03), Max: 37.4 (09 Jul 2025 16:58)  T(F): 98.2 (10 Jul 2025 09:03), Max: 99.3 (09 Jul 2025 16:58)  HR: 57 (10 Jul 2025 09:03) (55 - 88)  BP: 117/66 (10 Jul 2025 09:03) (107/65 - 128/79)  BP(mean): 82 (09 Jul 2025 21:50) (80 - 90)  RR: 18 (10 Jul 2025 09:03) (16 - 18)  SpO2: 95% (10 Jul 2025 09:03) (92% - 98%)    Parameters below as of 10 Jul 2025 09:03  Patient On (Oxygen Delivery Method): room air        I&O's Detail    09 Jul 2025 07:01  -  10 Jul 2025 07:00  --------------------------------------------------------  IN:    Lactated Ringers: 150 mL    Oral Fluid: 300 mL  Total IN: 450 mL    OUT:    Voided (mL): 750 mL  Total OUT: 750 mL    Total NET: -300 mL          Daily Height in cm: 172.72 (09 Jul 2025 17:15)    Daily     LABS:                        12.8   13.63 )-----------( 195      ( 09 Jul 2025 04:12 )             39.0     07-09    137  |  103  |  7   ----------------------------<  119[H]  3.5   |  23  |  0.86    Ca    8.9      09 Jul 2025 04:12  Phos  3.4     07-09  Mg     1.8     07-09    TPro  6.8  /  Alb  3.9  /  TBili  0.6  /  DBili  x   /  AST  16  /  ALT  27  /  AlkPhos  73  07-09      Urinalysis Basic - ( 09 Jul 2025 04:12 )    Color: x / Appearance: x / SG: x / pH: x  Gluc: 119 mg/dL / Ketone: x  / Bili: x / Urobili: x   Blood: x / Protein: x / Nitrite: x   Leuk Esterase: x / RBC: x / WBC x   Sq Epi: x / Non Sq Epi: x / Bacteria: x          PHYSICAL EXAM  --------------------------------------------------------------------------------    PHYSICAL EXAM  --------------------------------------------------------------------------------  Physical Exam: PHYSICAL EXAM  GENERAL: NAD, lying in bed comfortably  CHEST: nonlabored, no increased WOB  ABDOMEN: Soft, Nondistended. mild epigastric and RUQ tenderness, incision sites appropriately tender.  EXTREMITIES: Well perfused. No clubbing, cyanosis, or edema  NERVOUS SYSTEM:  Alert & Oriented X3  	        Assessment and Plan:   · Assessment	  51yo M h/o HTN and inguinal hernia repair presenting with epigastric pain for 1 day, found to have large stone in GB neck, concern for early cholecystitis. POD2 lap reyna. recovering appropriately.      Incidental findings, as below, discussed with patient.  Indeterminate right renal mass. Renal mass protocol MRI recommended.    Plan  - if pt tolerates diet and is comfortable can be discharged to home

## 2025-07-10 NOTE — DISCHARGE NOTE NURSING/CASE MANAGEMENT/SOCIAL WORK - PATIENT PORTAL LINK FT
You can access the FollowMyHealth Patient Portal offered by [facility] by registering at the following website: [email or web address]. By joining [employer]’s FollowMyHealth portal, you will also be able to view your health information using other applications (apps) compatible with our system.

## 2025-07-10 NOTE — DISCHARGE NOTE NURSING/CASE MANAGEMENT/SOCIAL WORK - NSDCPEFALRISK_GEN_ALL_CORE
For information on Fall & Injury Prevention, visit: [email or web address] OR  [email or web address] OR  https://www.cdc.gov/steadi/patient.html

## 2025-07-10 NOTE — DISCHARGE NOTE PROVIDER - CARE PROVIDER_API CALL
Sean Hernández  Surgery (General Surgery)  [address], [facility] [address]  [place] [address]  Phone: [phone]  Fax: [phone]  Follow Up Time:

## 2025-07-10 NOTE — CHART NOTE - NSCHARTNOTEFT_GEN_A_CORE
Post Operative Note  Patient: STEPHANE ESCOTO 50y (1975) Male   MRN: [id number]  Location: [address]   Visit: 07-08-25 Inpatient  Date: 07-10-25 @ 02:56    Procedure: S/P laparoscopic cholecystectomy    Subjective: Patient seen and examined post operatively. Reports pain as controlled. Denies nausea, vomiting, fever, chills, chest pain, SOB, cough.      Objective:  Vitals: T(F): 98.1 (07-10-25 @ 01:00), Max: 99.3 (07-09-25 @ 16:58)  HR: 75 (07-10-25 @ 01:00)  BP: 113/68 (07-10-25 @ 01:00) (108/63 - 128/79)  RR: 18 (07-10-25 @ 01:00)  SpO2: 96% (07-10-25 @ 01:00)      Is & Os:  07-08-25 @ 07:01  -  07-09-25 @ 07:00  --------------------------------------------------------  IN: 0 mL / OUT: 1 mL / NET: -1 mL    07-09-25 @ 07:01  -  07-10-25 @ 02:56  --------------------------------------------------------  IN: 450 mL / OUT: 750 mL / NET: -300 mL        Physical Examination:  General: NAD, resting comfortably in bed  Respiratory: non-labored breathing  Abdomen: softly distended, appropriately tender, surgical incisions are c/d/i.   Neuro: awake     Imaging:  No post-op imaging studies    Assessment:  50yMale patient S/P laparoscopic cholecystectomy for acute cholecystitis     Plan:  - Pain: acetaminophen, toradol and oxycodone PRN   - Diet: regular   - Activity: OOB as tolerated  - AM labs  - IVF  - DVT ppx: SCD's & enoxaparin Injectable 40 every 24 hours    Date/Time: 07-10-25 @ 02:56 Post Operative Note  Patient: STEPHANE ESCOTO 50y (1975) Male   MRN: [id number]  Location: [address]   Visit: 07-08-25 Inpatient  Date: 07-10-25 @ 02:56    Procedure: S/P laparoscopic cholecystectomy    Subjective: Patient seen and examined post operatively. Reports pain as controlled. Denies nausea, vomiting, fever, chills, chest pain, SOB, cough.      Objective:  Vitals: T(F): 98.1 (07-10-25 @ 01:00), Max: 99.3 (07-09-25 @ 16:58)  HR: 75 (07-10-25 @ 01:00)  BP: 113/68 (07-10-25 @ 01:00) (108/63 - 128/79)  RR: 18 (07-10-25 @ 01:00)  SpO2: 96% (07-10-25 @ 01:00)      Is & Os:  07-08-25 @ 07:01  -  07-09-25 @ 07:00  --------------------------------------------------------  IN: 0 mL / OUT: 1 mL / NET: -1 mL    07-09-25 @ 07:01  -  07-10-25 @ 02:56  --------------------------------------------------------  IN: 450 mL / OUT: 750 mL / NET: -300 mL        Physical Examination:  General: NAD, resting comfortably in bed  Respiratory: non-labored breathing  Abdomen: softly distended, appropriately tender, surgical incisions are c/d/i.   Neuro: awake     Imaging:  No post-op imaging studies    Assessment:  50yMale patient S/P laparoscopic cholecystectomy for acute cholecystitis     Plan:  - Pain: acetaminophen, Toradol and oxycodone PRN   - Diet: regular   - Activity: OOB as tolerated  - AM labs  - IVF  - DVT ppx: SCD's & enoxaparin Injectable 40 every 24 hours    Trauma Surgery  [id number]

## 2025-07-10 NOTE — DISCHARGE NOTE PROVIDER - NSDCCPTREATMENT_GEN_ALL_CORE_FT
Call placed to patient  Voicemail left on home #  Advised patient to call office back  Need to inform that open orders placed by Dr. Howard Haider have been cancelled as Dr. Howard Haider has retired  Patient also needs to establish with new PCP PRINCIPAL PROCEDURE  Procedure: Laparoscopic cholecystectomy  Findings and Treatment: You may shower. Pat Dry abdomen. Leave the skin glue in place, they will fall off on their own in approximately 5-7 days.  BATHING: Please do not submerge wound underwater. You may shower and/or sponge bathe.  ACTIVITY: No heavy lifting anything more than 10-15lbs or straining. Otherwise, you may return to your usual level of physical activity. If you are taking narcotic pain medication (such as Percocet), do NOT drive a car, operate machinery or make important decisions.  DIET: Low fiber diet  NOTIFY YOUR SURGEON IF: You have any bleeding that does not stop, any pus draining from your wound, any fever (over 100.4 F) or chills, persistent nausea/vomiting with inability to tolerate food or liquids, persistent diarrhea, or if your pain is not controlled on your discharge pain medications.  FOLLOW-UP:  1. Please call to make a follow-up appointment within one week of discharge   2. Please follow up with your primary care physician in one week regarding your hospitalization.

## 2025-07-10 NOTE — DISCHARGE NOTE NURSING/CASE MANAGEMENT/SOCIAL WORK - FINANCIAL ASSISTANCE
[facility] provides services at a reduced cost to those who are determined to be eligible through [facility]’s financial assistance program. Information regarding [facility]’s financial assistance program can be found by going to [email or web address] or by calling [phone].

## 2025-07-15 RX ORDER — ACETAMINOPHEN 500 MG/5ML
LIQUID (ML) ORAL
Refills: 0 | DISCHARGE

## 2025-07-16 LAB — SURGICAL PATHOLOGY STUDY: SIGNIFICANT CHANGE UP

## 2025-07-29 PROBLEM — I10 ESSENTIAL (PRIMARY) HYPERTENSION: Chronic | Status: ACTIVE | Noted: 2025-07-08

## 2025-07-30 ENCOUNTER — APPOINTMENT (OUTPATIENT)
Dept: TRAUMA SURGERY | Facility: CLINIC | Age: 50
End: 2025-07-30
Payer: COMMERCIAL

## 2025-07-30 ENCOUNTER — TRANSCRIPTION ENCOUNTER (OUTPATIENT)
Age: 50
End: 2025-07-30

## 2025-07-30 PROCEDURE — 99024 POSTOP FOLLOW-UP VISIT: CPT

## 2025-08-07 ENCOUNTER — NON-APPOINTMENT (OUTPATIENT)
Age: 50
End: 2025-08-07

## 2025-08-08 ENCOUNTER — APPOINTMENT (OUTPATIENT)
Dept: UROLOGY | Facility: CLINIC | Age: 50
End: 2025-08-08

## 2025-08-08 VITALS
HEART RATE: 101 BPM | TEMPERATURE: 98.4 F | HEIGHT: 68 IN | SYSTOLIC BLOOD PRESSURE: 117 MMHG | WEIGHT: 220 LBS | BODY MASS INDEX: 33.34 KG/M2 | DIASTOLIC BLOOD PRESSURE: 82 MMHG | OXYGEN SATURATION: 97 %

## 2025-08-08 DIAGNOSIS — N28.89 OTHER SPECIFIED DISORDERS OF KIDNEY AND URETER: ICD-10-CM

## 2025-08-08 PROCEDURE — 99204 OFFICE O/P NEW MOD 45 MIN: CPT

## 2025-08-17 PROBLEM — N28.89 RENAL MASS: Status: ACTIVE | Noted: 2025-08-11

## (undated) DEVICE — SUT MONOCRYL 4-0 18" PS-2

## (undated) DEVICE — Device

## (undated) DEVICE — POSITIONER FOAM EGG CRATE ULNAR 2PCS (PINK)

## (undated) DEVICE — ENDOCATCH 10MM

## (undated) DEVICE — SUT POLYSORB 3-0 30" V-20 UNDYED

## (undated) DEVICE — ELCTR BOVIE PENCIL SMOKE EVACUATION

## (undated) DEVICE — SOL IRR POUR H2O 250ML

## (undated) DEVICE — DRAPE C ARM UNIVERSAL

## (undated) DEVICE — TUBING STRYKEFLOW II SUCTION / IRRIGATOR

## (undated) DEVICE — PACK ADVANCED LAPAROSCOPIC NS

## (undated) DEVICE — VENODYNE/SCD SLEEVE CALF MEDIUM

## (undated) DEVICE — SUT BIOSYN 4-0 18" P-12

## (undated) DEVICE — DRSG OPSITE 2.5 X 2"

## (undated) DEVICE — TROCAR COVIDIEN VERSASTEP 5MM STANDARD

## (undated) DEVICE — SPECIMEN CONTAINER 100ML

## (undated) DEVICE — DISSECTOR ENDO PEANUT 5MM

## (undated) DEVICE — TROCAR COVIDIEN VERSAONE FIXATION CANNULA 5MM

## (undated) DEVICE — TROCAR APPLIED MEDICAL KII BALLOON BLUNT TIP 12MM X 100MM

## (undated) DEVICE — SUT SURGIPRO 2-0 30" GS-22

## (undated) DEVICE — SUT POLYSORB 2-0 30" V-20 UNDYED

## (undated) DEVICE — DRAPE TOWEL BLUE 17" X 24"

## (undated) DEVICE — SOL IRR POUR NS 0.9% 500ML

## (undated) DEVICE — INSUFFLATION NDL COVIDIEN STEP 14G FOR STEP/VERSASTEP

## (undated) DEVICE — D HELP - CLEARVIEW CLEARIFY SYSTEM

## (undated) DEVICE — MEDICATION LABELS W MARKER

## (undated) DEVICE — WARMING BLANKET UPPER ADULT

## (undated) DEVICE — SUT POLYSORB 0 36" GU-46

## (undated) DEVICE — SHEARS COVIDIEN ENDO SHEAR 5MM X 31CM W UNIPOLAR CAUTERY

## (undated) DEVICE — SYR LUER LOK 20CC

## (undated) DEVICE — GOWN TRIMAX LG

## (undated) DEVICE — DRSG STERISTRIPS 0.5 X 4"

## (undated) DEVICE — TROCAR COVIDIEN VERSAPORT BLADELESS OPTICAL 5MM STANDARD